# Patient Record
Sex: MALE | Employment: UNEMPLOYED | URBAN - METROPOLITAN AREA
[De-identification: names, ages, dates, MRNs, and addresses within clinical notes are randomized per-mention and may not be internally consistent; named-entity substitution may affect disease eponyms.]

---

## 2018-09-05 ENCOUNTER — ED HISTORICAL/CONVERTED ENCOUNTER (OUTPATIENT)
Dept: OTHER | Age: 43
End: 2018-09-05

## 2022-10-23 ENCOUNTER — HOSPITAL ENCOUNTER (EMERGENCY)
Age: 47
Discharge: HOME | End: 2022-10-23
Attending: STUDENT IN AN ORGANIZED HEALTH CARE EDUCATION/TRAINING PROGRAM

## 2022-10-23 ENCOUNTER — APPOINTMENT (OUTPATIENT)
Dept: GENERAL RADIOLOGY | Age: 47
End: 2022-10-23
Attending: STUDENT IN AN ORGANIZED HEALTH CARE EDUCATION/TRAINING PROGRAM

## 2022-10-23 VITALS
HEART RATE: 70 BPM | DIASTOLIC BLOOD PRESSURE: 95 MMHG | HEIGHT: 64 IN | OXYGEN SATURATION: 99 % | TEMPERATURE: 98.5 F | SYSTOLIC BLOOD PRESSURE: 152 MMHG | WEIGHT: 170 LBS | RESPIRATION RATE: 16 BRPM | BODY MASS INDEX: 29.02 KG/M2

## 2022-10-23 DIAGNOSIS — S62.614A CLOSED DISPLACED FRACTURE OF PROXIMAL PHALANX OF RIGHT RING FINGER, INITIAL ENCOUNTER: Primary | ICD-10-CM

## 2022-10-23 DIAGNOSIS — S61.214A LACERATION OF RIGHT RING FINGER WITHOUT FOREIGN BODY WITHOUT DAMAGE TO NAIL, INITIAL ENCOUNTER: ICD-10-CM

## 2022-10-23 PROCEDURE — 99284 EMERGENCY DEPT VISIT MOD MDM: CPT

## 2022-10-23 PROCEDURE — 90471 IMMUNIZATION ADMIN: CPT

## 2022-10-23 PROCEDURE — 75810000293 HC SIMP/SUPERF WND  RPR

## 2022-10-23 PROCEDURE — 90714 TD VACC NO PRESV 7 YRS+ IM: CPT | Performed by: STUDENT IN AN ORGANIZED HEALTH CARE EDUCATION/TRAINING PROGRAM

## 2022-10-23 PROCEDURE — 96372 THER/PROPH/DIAG INJ SC/IM: CPT

## 2022-10-23 PROCEDURE — 74011250636 HC RX REV CODE- 250/636: Performed by: STUDENT IN AN ORGANIZED HEALTH CARE EDUCATION/TRAINING PROGRAM

## 2022-10-23 PROCEDURE — 74011000250 HC RX REV CODE- 250: Performed by: STUDENT IN AN ORGANIZED HEALTH CARE EDUCATION/TRAINING PROGRAM

## 2022-10-23 PROCEDURE — 73140 X-RAY EXAM OF FINGER(S): CPT

## 2022-10-23 RX ORDER — BACITRACIN ZINC 500 UNIT/G
1 OINTMENT IN PACKET (EA) TOPICAL 3 TIMES DAILY
Status: DISCONTINUED | OUTPATIENT
Start: 2022-10-23 | End: 2022-10-23 | Stop reason: HOSPADM

## 2022-10-23 RX ORDER — ACETAMINOPHEN 325 MG/1
650 TABLET ORAL
Qty: 20 TABLET | Refills: 0 | Status: SHIPPED | OUTPATIENT
Start: 2022-10-23

## 2022-10-23 RX ORDER — AMOXICILLIN AND CLAVULANATE POTASSIUM 875; 125 MG/1; MG/1
1 TABLET, FILM COATED ORAL 2 TIMES DAILY
Qty: 20 TABLET | Refills: 0 | Status: SHIPPED | OUTPATIENT
Start: 2022-10-23 | End: 2022-11-02

## 2022-10-23 RX ORDER — IBUPROFEN 600 MG/1
600 TABLET ORAL
Qty: 20 TABLET | Refills: 0 | Status: SHIPPED | OUTPATIENT
Start: 2022-10-23

## 2022-10-23 RX ORDER — LIDOCAINE HYDROCHLORIDE 10 MG/ML
10 INJECTION INFILTRATION; PERINEURAL ONCE
Status: DISCONTINUED | OUTPATIENT
Start: 2022-10-23 | End: 2022-10-23 | Stop reason: HOSPADM

## 2022-10-23 RX ADMIN — TETANUS AND DIPHTHERIA TOXOIDS ADSORBED 0.5 ML: 2; 2 INJECTION INTRAMUSCULAR at 13:01

## 2022-10-23 RX ADMIN — Medication 1 PACKET: at 15:36

## 2022-10-23 NOTE — Clinical Note
[address]  [address] [facility] [address]  [phone]    Work/School Note    Date: 10/23/2022    To Whom It May concern:    Yin Saliva was seen and treated today in the emergency room by the following provider(s):  Attending Provider: Edward Garcia MD.      Yin Saliva is excused from work/school on 10/23/22 and 10/24/22. He is medically clear to return to work/school on 10/25/2022.        Sincerely,          Fredy Barriga MD

## 2022-10-23 NOTE — DISCHARGE INSTRUCTIONS
Thank you! Thank you for allowing me to care for you in the emergency department. I sincerely hope that you are satisfied with your visit today. It is my goal to provide you with excellent care. Below you will find a list of your labs and imaging from your visit today if applicable. Should you have any questions regarding these results please do not hesitate to call the emergency department. Please review VOZ for a more detailed result list since the below list may not be comprehensive. Instructions on how to sign up to VOZ should be provided in this packet. Labs -   No results found for this or any previous visit (from the past 12 hour(s)). Radiologic Studies -   XR 2ND FINGER RT MIN 2 V   Final Result   1. Acute fracture proximal phalanx of the index finger           CT Results  (Last 48 hours)      None          CXR Results  (Last 48 hours)      None               If you feel that you have not received excellent quality care or timely care, please ask to speak to the nurse manager. Please choose us in the future for your continued health care needs. ------------------------------------------------------------------------------------------------------------  The exam and treatment you received in the Emergency Department were for an urgent problem and are not intended as complete care. It is important that you follow-up with a doctor, nurse practitioner, or physician assistant to:  (1) confirm your diagnosis,  (2) re-evaluation of changes in your illness and treatment, and  (3) for ongoing care. If your symptoms become worse or you do not improve as expected and you are unable to reach your usual health care provider, you should return to the Emergency Department. We are available 24 hours a day. Please take your discharge instructions with you when you go to your follow-up appointment.      If a prescription has been provided, please have it filled as soon as possible to prevent a delay in treatment. Read the entire medication instruction sheet provided to you by the pharmacy. If you have any questions or reservations about taking the medication due to side effects or interactions with other medications, please call your primary care physician or contact the ER to speak with the charge nurse. Make an appointment with your family doctor or the physician you were referred to for follow-up of this visit as instructed on your discharge paperwork, as this is a mandatory follow-up. Return to the ER if you are unable to be seen or if you are unable to be seen in a timely manner. If you have any problem arranging the follow-up visit, contact the Emergency Department immediately.

## 2022-10-23 NOTE — ED PROVIDER NOTES
[phone]  EMERGENCY DEPARTMENT ENCOUNTER NOTE    Date: 10/23/2022  Patient Name: Odie Seip    History of Presenting Illness     Chief Complaint   Patient presents with    Laceration     HPI: Odie Seip, 52 y.o. male with no known past medical history or medications presents for right fourth finger injury. He was working with heavy weights and one of the weights fell down and hit his fourth finger on the right side at the level of the PIP. He has a laceration. Hemostatic on scene. He reports mild to moderate pain over the proximal phalanx. No weakness or numbness in the rest of the finger. Intact range of motion of the PIP and DIP however there is tenderness over the proximal phalanx. Nailbed is intact. Unknown last tetanus. No other complaints. Medical History   I reviewed the medical, surgical, family, and social history, as well as allergies:    PCP: None    Past Medical History:  No past medical history on file. Past Surgical History:  No past surgical history on file. Current Outpatient Medications:  Current Outpatient Medications   Medication Instructions    acetaminophen (TYLENOL) 650 mg, Oral, 4 TIMES DAILY AS NEEDED    amoxicillin-clavulanate (Augmentin) 875-125 mg per tablet 1 Tablet, Oral, 2 TIMES DAILY    ibuprofen (MOTRIN) 600 mg, Oral, 3 TIMES DAILY AS NEEDED      Family History:  No family history on file. Social History: Allergies:  No Known Allergies    Review of Systems     Review of Systems  Negative: Positives and pertinent negatives as per HPI. All other systems were reviewed and are negative. Physical Exam & Vital Signs   Vital Signs - I reviewed the patient's vital signs.     Patient Vitals for the past 12 hrs:   Temp Pulse Resp BP SpO2   10/23/22 1234 98.5 °F (36.9 °C) 70 16 (!) 152/95 99 %     Physical Exam:    GENERAL: not in apparent distress  HEENT:  * EOMI  * Head atraumatic  CV:  * warm extremities  * no cyanosis  PULMONARY: no respiratory distress, non labored breathing, no audible wheezing or stridor, no accessory muscle use  ABDOMEN: soft, moving in bed and pulls to seated position without grimace or pain  EXTREMITIES/BACK: moving four extremities without limitation. Examination of the hand reveals a laceration and abrasion over the proximal interphalangeal joint of the right fourth finger. There is some tenderness to palpation as well over the proximal phalanx. Normal PIP and DIP range of motion. Normal cap refill. Normal sensation of the finger. No tenderness over the hand or wrist and no other injuries. SKIN: no rashes or signs of trauma  NEURO:  * Speech clear  * GCS 15    Medical Decision Making     Patient is a 52 y.o. male presenting for crush injury of 4th finger with lac and proc phalanx tenderness. Vitals reveal no significant abnormalities and physical exam reveals  lac . Based on the history, physical exam, risk factors, and vital signs, differential includes: Laceration, abrasion, fracture, dislocation, soft tissue contusion. Will give tetanus. We will do x-ray. See ED Course and Reassessment for evaluation and discussion. EMR Automatically Imported Results     Labs:  No results found for this or any previous visit (from the past 12 hour(s)).   Radiologic Studies:  CT Results  (Last 48 hours)      None          CXR Results  (Last 48 hours)      None          Medications ordered:  Medications   lidocaine (XYLOCAINE) 10 mg/mL (1 %) injection 10 mL (has no administration in time range)   bacitracin zinc 500 unit/gram packet 1 Packet (has no administration in time range)   tetanus-diphtheria toxoids-Td (Td) 2-2 Lf unit/0.5 mL injection 0.5 mL (0.5 mL IntraMUSCular Given 10/23/22 1301)       ED Course & Reassessment     ED Course:     ED Course as of 10/23/22 1536   Sun Oct 23, 2022   1501 XR: mildly displaced fx. [SS]      ED Course User Index  [SS] Oh Esteves MD Reassessment:    Understanding was insured that at this time there is no evidence for a more malignant underlying process, but that early in the process of an illness, an emergency department workup can be falsely reassuring. Routine discharge counseling was given including the fact that any worsening, changing or persistent symptoms should prompt an immediate call or follow up with their primary physician or the emergency department. The importance of appropriate follow up was also discussed. More extensive discharge instructions were given in the patient's discharge paperwork. After completion of evaluation and discussion of results and diagnoses, all the questions were answered. If required, all follow up appointments and treatments were discussed and explained. Understanding was insured prior to discharge. Follow-up was stressed with orthopedics and return precautions were given given the high risk injury and the possible development of infection. Final Disposition     Discharge: DISCHARGED FROM EMERGENCY DEPARTMENT    Patient will be discharged from the Emergency Department in stable condition. All of the diagnostic tests were reviewed and any questions were answered. Diagnosis, results, follow up if applicable, and return precautions were discussed. I have also put together printed discharge instructions for them that include: 1) educational information regarding their diagnosis, 2) how to care for their diagnosis at home, as well a 3) list of reasons why they would want to return to the ED prior to their follow-up appointment, should their condition change. Any labs or imaging done in the ED will be either printed with the discharge paperwork or available through [address] DISCHARGE PLAN:  1. There are no discharge medications for this patient.      2.   Follow-up Information       Follow up With Specialties Details Why Contact Info    [address] [facility] Emergency Medicine Go to If symptoms worsen [address] [place] [address] 908 [facility]    Dewey Somers MD Orthopedic Surgery Schedule an appointment as soon as possible for a visit in 1 week  [address] [facility] [address]  [address] [facility] [address] [phone] [address] [facility] Emergency Medicine Schedule an appointment as soon as possible for a visit in 1 week For suture removal [address] [facility]  [phone]          3. Return to ED if worse    4. Current Discharge Medication List        START taking these medications    Details   amoxicillin-clavulanate (Augmentin) 875-125 mg per tablet Take 1 Tablet by mouth two (2) times a day for 10 days. Qty: 20 Tablet, Refills: 0  Start date: 10/23/2022, End date: 11/2/2022      acetaminophen (TYLENOL) 325 mg tablet Take 2 Tablets by mouth four (4) times daily as needed for Pain. Qty: 20 Tablet, Refills: 0  Start date: 10/23/2022      ibuprofen (MOTRIN) 600 mg tablet Take 1 Tablet by mouth three (3) times daily as needed for Pain. Qty: 20 Tablet, Refills: 0  Start date: 10/23/2022             ED Procedures & EKGs   Performed by: Venus Bonilla MD  Procedures     PROCEDURE: Laceration Repair    Performed by: Venus Bonilla MD  Date: 10/23/2022    Consent obtained from patient prior to the procedure. Indications, risks, and benefits were explained including but not limited to bleeding, infection, scarring, dehiscence, and pain. An appropriate time out was taken documenting proper procedure, location, and patient. My hands were washed immediately prior to the procedure. I wore sterile gloves throughout the procedure.     Laceration repair:  Location: 4th finger  Length before repair: 1cm  Shape: linear  Layered repair?: no  Heavy contamination?: no  Required debridement?: no  Debris required substantial irrigation?: no  Laceration is hemostatic  Neurovascular exam intact  Anesthesia achieved via digital block w lidocaine. Amount of anesthetic use:5ml  Wound is irrigated thoroughly with sterile saline  Laceration repaired using propelene  Number of sutures or staples: 4  Laceration length after repair: same    Sedation required? no    PROCEDURE: Digital Nerve Block    Performed by: Maricruz Tenorio MD  Date: 10/23/2022    - Indication: Anesthesia of digit  -  Consent obtained from patient prior to the procedure. Indications, risks, and benefits were explained including bleeding, vessel injury, infection, and pain. Questions were sought and answered. - Location and approach details: the 4th digit was washed and scrubbed with alcohol wipes and chlorhexidine solution.  - Injection: using standard, sterile technique 5ml of lidocaine without epinepherine injected into radial and ulnar aspects of the base of the digit, making sure to aspirate before injection to ensure placement is not within vasculature. - Post procedure anesthesia assessment: intact cap refill. No cyanosis. - Patient tolerated well without complication. Clinical Tools & Critical Care     HEART SCORE  Heart score not applicable: no chest pain . SEPSIS REASSESSMENT NOTE  Sepsis reassessment note not applicable. CRITICAL CARE DOCUMENTATION  NOT MET: Critical care billing criteria and/or time were NOT met. Diagnosis     Clinical Impression:   1. Closed displaced fracture of proximal phalanx of right ring finger, initial encounter    2. Laceration of right ring finger without foreign body without damage to nail, initial encounter        Attestations:  Maricruz Tenorio MD    Documentation Comments   - I am the first and primary provider for this patient and am the primary provider of record. - Initial assessment performed. The patients presenting problems have been discussed, and the staff are in agreement with the care plan formulated and outlined with them. I have encouraged them to ask questions as they arise throughout their visit.   - Available medical records, nursing notes, old EKGs, and EMS run sheets (if patient was EMS transported) were reviewed    Please note that this dictation was completed with [employer], the computer voice recognition software. Quite often unanticipated grammatical, syntax, homophones, and other interpretive errors are inadvertently transcribed by the computer software. Please disregard these errors. Please excuse any errors that have escaped final proofreading.